# Patient Record
Sex: FEMALE | Race: OTHER | NOT HISPANIC OR LATINO
[De-identification: names, ages, dates, MRNs, and addresses within clinical notes are randomized per-mention and may not be internally consistent; named-entity substitution may affect disease eponyms.]

---

## 2022-03-04 PROBLEM — Z00.00 ENCOUNTER FOR PREVENTIVE HEALTH EXAMINATION: Status: ACTIVE | Noted: 2022-03-04

## 2022-05-11 ENCOUNTER — APPOINTMENT (OUTPATIENT)
Dept: NEPHROLOGY | Facility: CLINIC | Age: 77
End: 2022-05-11
Payer: MEDICARE

## 2022-05-11 VITALS
SYSTOLIC BLOOD PRESSURE: 131 MMHG | DIASTOLIC BLOOD PRESSURE: 79 MMHG | TEMPERATURE: 97.9 F | HEIGHT: 64 IN | WEIGHT: 163.14 LBS | HEART RATE: 80 BPM | OXYGEN SATURATION: 97 % | BODY MASS INDEX: 27.85 KG/M2

## 2022-05-11 VITALS — SYSTOLIC BLOOD PRESSURE: 120 MMHG | DIASTOLIC BLOOD PRESSURE: 80 MMHG

## 2022-05-11 VITALS — SYSTOLIC BLOOD PRESSURE: 110 MMHG | DIASTOLIC BLOOD PRESSURE: 70 MMHG

## 2022-05-11 DIAGNOSIS — D63.1 CHRONIC KIDNEY DISEASE, UNSPECIFIED: ICD-10-CM

## 2022-05-11 DIAGNOSIS — Z78.9 OTHER SPECIFIED HEALTH STATUS: ICD-10-CM

## 2022-05-11 DIAGNOSIS — N18.9 CHRONIC KIDNEY DISEASE, UNSPECIFIED: ICD-10-CM

## 2022-05-11 DIAGNOSIS — Z86.018 PERSONAL HISTORY OF OTHER BENIGN NEOPLASM: ICD-10-CM

## 2022-05-11 DIAGNOSIS — Z86.010 PERSONAL HISTORY OF COLONIC POLYPS: ICD-10-CM

## 2022-05-11 DIAGNOSIS — Z86.39 PERSONAL HISTORY OF OTHER ENDOCRINE, NUTRITIONAL AND METABOLIC DISEASE: ICD-10-CM

## 2022-05-11 PROCEDURE — 99204 OFFICE O/P NEW MOD 45 MIN: CPT

## 2022-05-11 RX ORDER — ATORVASTATIN CALCIUM 10 MG/1
10 TABLET, FILM COATED ORAL
Qty: 90 | Refills: 3 | Status: ACTIVE | COMMUNITY
Start: 2022-05-11

## 2022-05-11 RX ORDER — MULTIVIT-MIN/FOLIC/VIT K/LYCOP 400-300MCG
1000 TABLET ORAL DAILY
Refills: 0 | Status: DISCONTINUED | COMMUNITY
Start: 2022-05-11 | End: 2022-05-11

## 2022-05-12 LAB
25(OH)D3 SERPL-MCNC: 37.5 NG/ML
ALBUMIN SERPL ELPH-MCNC: 4.6 G/DL
ANION GAP SERPL CALC-SCNC: 13 MMOL/L
APPEARANCE: CLEAR
BACTERIA: NEGATIVE
BILIRUBIN URINE: NEGATIVE
BLOOD URINE: NEGATIVE
BUN SERPL-MCNC: 22 MG/DL
CALCIUM SERPL-MCNC: 9.6 MG/DL
CALCIUM SERPL-MCNC: 9.6 MG/DL
CHLORIDE SERPL-SCNC: 103 MMOL/L
CO2 SERPL-SCNC: 24 MMOL/L
COLOR: NORMAL
CREAT SERPL-MCNC: 1.53 MG/DL
CREAT SPEC-SCNC: 128 MG/DL
CREAT/PROT UR: 0.1 RATIO
DEPRECATED KAPPA LC FREE/LAMBDA SER: 1.29 RATIO
EGFR: 35 ML/MIN/1.73M2
ESTIMATED AVERAGE GLUCOSE: 143 MG/DL
GLUCOSE QUALITATIVE U: NEGATIVE
GLUCOSE SERPL-MCNC: 96 MG/DL
HBA1C MFR BLD HPLC: 6.6 %
HCT VFR BLD CALC: 38.4 %
HGB BLD-MCNC: 11.8 G/DL
HYALINE CASTS: 0 /LPF
KAPPA LC CSF-MCNC: 2.43 MG/DL
KAPPA LC SERPL-MCNC: 3.13 MG/DL
KETONES URINE: NEGATIVE
LEUKOCYTE ESTERASE URINE: NEGATIVE
MICROSCOPIC-UA: NORMAL
NITRITE URINE: NEGATIVE
PARATHYROID HORMONE INTACT: 41 PG/ML
PH URINE: 5.5
PHOSPHATE SERPL-MCNC: 4.3 MG/DL
POTASSIUM SERPL-SCNC: 4.4 MMOL/L
PROT UR-MCNC: 6 MG/DL
PROTEIN URINE: NEGATIVE
RED BLOOD CELLS URINE: 1 /HPF
SODIUM SERPL-SCNC: 141 MMOL/L
SPECIFIC GRAVITY URINE: 1.02
SQUAMOUS EPITHELIAL CELLS: 0 /HPF
UROBILINOGEN URINE: NORMAL
WHITE BLOOD CELLS URINE: 1 /HPF

## 2022-05-12 NOTE — REVIEW OF SYSTEMS
[Feeling Tired] : feeling tired [As Noted in HPI] : as noted in HPI [Negative] : Heme/Lymph [Recent Weight Loss (___ Lbs)] : no recent weight loss [Lower Ext Edema] : no lower extremity edema [Wheezing] : no wheezing [Cough] : no cough [SOB on Exertion] : no shortness of breath during exertion [PND] : no PND [FreeTextEntry2] : recent COVID s/o mAB infusion

## 2022-05-12 NOTE — PHYSICAL EXAM
[General Appearance - Alert] : alert [General Appearance - In No Acute Distress] : in no acute distress [Sclera] : the sclera and conjunctiva were normal [Jugular Venous Distention Increased] : there was no jugular-venous distention [Auscultation Breath Sounds / Voice Sounds] : lungs were clear to auscultation bilaterally [Heart Rate And Rhythm] : heart rate was normal and rhythm regular [Heart Sounds] : normal S1 and S2 [Edema] : there was no peripheral edema [Abdomen Soft] : soft [] : no rash [No Focal Deficits] : no focal deficits [Oriented To Time, Place, And Person] : oriented to person, place, and time

## 2022-05-12 NOTE — HISTORY OF PRESENT ILLNESS
[FreeTextEntry1] : 76 year old female with Hx of DM2, HTN for 15 years. her DM is controlled expect for questionable retinopathy where laser therapy was done and since then her eye exams have unremarkable. her BP is treated with Irbesartan the dose of which has been reduced to 150mg from 300mg. her BP at home is well controlled but reports few episodes of low BP. her Cr has been high since 2017 and was seen at [facility] for that. her cr levels ranges from 1.2-1.6 but trend is up and down. most recently she was in the hospital for COVID and her cr was 1.6. on review of her reports brought with her show US of the kidneys with normal size, mild echogenecity and few undetermined lesion( too small). she denies NSAIDS intake. she does have few hundred mg of  proteinuria. no edema . she does have anemia and was told she has uterine fibroid. colonoscopy was done. polys removed. no cancer was found

## 2022-05-12 NOTE — ASSESSMENT
BOTOX DISCHARGE INSTRUCTIONS:    Eyelid drooping or facial muscle weakness is one of the possible side effect of Botox injections.     If this happens, it can take 2 weeks or longer to resolve.     Extreme caution is used to place the medication in areas that will not cause this issue, but it is still a possibility.     Do not rub the eye or forehead areas for 24 hours after injections. If you do, some of the Botox can get displaced into different muscles, such as the ones that life the eyelid and/or brow. This can cause eyelid drooping, muscle weakness, or some other side effect you may not wish to experience.     Try to remain sitting for 4 hours after Botox injections.     Do not lie down for 8-12 hours after injections.     Avoid massages, hot showers, and saunas for 24 hours.     Avoid running or intense exercise for 24 hours.           For Your Information   In order to manage your health all in once place (contact your provider, request refills, receive results quicker, attend virtual video visits, etc) sign up for for a [employer] account. You can go online to [employer] and click Sign Up Now.     You can also create an account on your mobile device by downloading the [employer] alina from the Alina Store or Google Play.      · If your provider ordered TESTING today, you will typically be notified of your test results within 3-5 business days unless specified otherwise. If you have not received your results within 5 business days please call your provider's office.     PLEASE NOTE: Some lab testing has been delayed due to the current public health emergency. Thank you for your patience as we work to provide you with results as soon as we possibly can!    · If your provider ordered IMAGING or other specialized tests to be performed, you will typically be contacted to schedule this testing within 2 business days.     PLEASE NOTE: Some scheduling has been delayed due to the current public health  [FreeTextEntry1] : CKD stage 3 with minimal proteinuria and anemia \par HTN well controlled.. episodes of hypotension ( nonorthostatic today) \par images;  reviewed reports \par A/CKD with a likely hemodynamic related variation in her kidney function due to Hypotension and ARB decreasing the ability of the kidneys to compensate and ? some degree of autonomic dysfunction fro DM. \par -will decrease Irbesartan to 75mg to be taken in am. \par -DC vitamin C as it can cause increase in  Ca oxalate production and  precipitation  \par -asked her to check BP daily at different times and report to me in 2 weeks \par -check repeat cr in 1 month to see if improved Rx given \par anemia;\par -check iron studies  if low will consider po versus IV depending on severity. \par -no need for EPO as hgb>10. \par -r/o MM.. SPEP/IPEP/free immunoglobulin sent as well\par -send protein/cr ratio \par RTC in 3 months \par  emergency. If you have not received a call to schedule within that timeframe, please contact Central Scheduling at [phone]. Again, thank you for your patience!    · If you are in need of a medication refill please use one of the following options:  1. Call your pharmacy if there are refills remaining.  2. [email or web address]- [email or web address]  3. Call your providers office    PLEASE BE AWARE OF WHAT MEDICATIONS YOU ARE CURRENTLY TAKING, INCLUDING OVER-THE-COUNTER MEDICATIONS AND SUPPLEMENTS. REVIEW THE LIST INCLUDED WITH THIS PACKET AND BE SURE TO NOTIFY THE PROVIDER OF ANY CHANGES THAT NEED TO BE MADE.     · You may be receiving a survey via mail or e-mail following your visit.  Please take the time to complete this, as your feedback is very important to us!  We strive to make your experience exceptional and your comments help us with that goal.  We look forward to hearing from you!    · For future visits, please arrive 15 minutes earlier than your scheduled appointment time to allow the nurse enough time to update your chart prior to seeing the provider. This ensures you receive the full amount of visit time with your provider. Thank you!

## 2022-05-16 LAB
ALBUMIN MFR SERPL ELPH: 60.9 %
ALBUMIN SERPL-MCNC: 4.2 G/DL
ALBUMIN/GLOB SERPL: 1.6 RATIO
ALPHA1 GLOB MFR SERPL ELPH: 4.2 %
ALPHA1 GLOB SERPL ELPH-MCNC: 0.3 G/DL
ALPHA2 GLOB MFR SERPL ELPH: 12.6 %
ALPHA2 GLOB SERPL ELPH-MCNC: 0.9 G/DL
B-GLOBULIN MFR SERPL ELPH: 10.3 %
B-GLOBULIN SERPL ELPH-MCNC: 0.7 G/DL
DEPRECATED KAPPA LC FREE/LAMBDA SER: 1.29 RATIO
FERRITIN SERPL-MCNC: 55 NG/ML
GAMMA GLOB FLD ELPH-MCNC: 0.8 G/DL
GAMMA GLOB MFR SERPL ELPH: 12 %
IGA SER QL IEP: 129 MG/DL
IGG SER QL IEP: 827 MG/DL
IGM SER QL IEP: 67 MG/DL
INTERPRETATION SERPL IEP-IMP: NORMAL
IRON SATN MFR SERPL: 19 %
IRON SERPL-MCNC: 57 UG/DL
KAPPA LC CSF-MCNC: 2.43 MG/DL
KAPPA LC SERPL-MCNC: 3.13 MG/DL
M PROTEIN SPEC IFE-MCNC: NORMAL
PROT SERPL-MCNC: 6.9 G/DL
PROT SERPL-MCNC: 6.9 G/DL
TIBC SERPL-MCNC: 304 UG/DL
UIBC SERPL-MCNC: 247 UG/DL

## 2022-07-05 ENCOUNTER — RX RENEWAL (OUTPATIENT)
Age: 77
End: 2022-07-05

## 2022-08-01 ENCOUNTER — RX RENEWAL (OUTPATIENT)
Age: 77
End: 2022-08-01

## 2022-08-10 ENCOUNTER — APPOINTMENT (OUTPATIENT)
Dept: NEPHROLOGY | Facility: CLINIC | Age: 77
End: 2022-08-10

## 2022-08-10 ENCOUNTER — RX RENEWAL (OUTPATIENT)
Age: 77
End: 2022-08-10

## 2022-08-10 VITALS
DIASTOLIC BLOOD PRESSURE: 84 MMHG | WEIGHT: 163.14 LBS | OXYGEN SATURATION: 97 % | HEART RATE: 67 BPM | HEIGHT: 64 IN | SYSTOLIC BLOOD PRESSURE: 153 MMHG | BODY MASS INDEX: 27.85 KG/M2

## 2022-08-10 VITALS — SYSTOLIC BLOOD PRESSURE: 140 MMHG | DIASTOLIC BLOOD PRESSURE: 70 MMHG

## 2022-08-10 PROCEDURE — 99214 OFFICE O/P EST MOD 30 MIN: CPT | Mod: GC

## 2022-08-10 RX ORDER — DOXYCYCLINE HYCLATE 100 MG/1
100 CAPSULE ORAL
Qty: 20 | Refills: 0 | Status: COMPLETED | COMMUNITY
Start: 2022-06-30

## 2022-08-10 RX ORDER — AMOXICILLIN 875 MG/1
875 TABLET, FILM COATED ORAL
Qty: 14 | Refills: 0 | Status: COMPLETED | COMMUNITY
Start: 2022-07-25

## 2022-08-10 RX ORDER — PREDNISONE 10 MG/1
10 TABLET ORAL
Qty: 6 | Refills: 0 | Status: COMPLETED | COMMUNITY
Start: 2022-07-25

## 2022-08-10 RX ORDER — BLOOD SUGAR DIAGNOSTIC
STRIP MISCELLANEOUS
Qty: 100 | Refills: 0 | Status: COMPLETED | COMMUNITY
Start: 2022-05-23

## 2022-08-10 RX ORDER — CHLORHEXIDINE GLUCONATE, 0.12% ORAL RINSE 1.2 MG/ML
0.12 SOLUTION DENTAL
Qty: 473 | Refills: 0 | Status: COMPLETED | COMMUNITY
Start: 2022-07-25

## 2022-08-10 RX ORDER — TRIAMCINOLONE ACETONIDE 1 MG/G
0.1 CREAM TOPICAL
Qty: 45 | Refills: 0 | Status: COMPLETED | COMMUNITY
Start: 2022-07-26

## 2022-08-10 RX ORDER — LANCETS 30 GAUGE
EACH MISCELLANEOUS
Qty: 100 | Refills: 0 | Status: COMPLETED | COMMUNITY
Start: 2022-06-20

## 2022-08-10 RX ORDER — FUROSEMIDE 20 MG/1
20 TABLET ORAL
Qty: 90 | Refills: 3 | Status: DISCONTINUED | COMMUNITY
Start: 2022-06-30 | End: 2022-08-10

## 2022-08-10 NOTE — PHYSICAL EXAM
[General Appearance - Alert] : alert [General Appearance - In No Acute Distress] : in no acute distress [General Appearance - Well-Appearing] : healthy appearing [Sclera] : the sclera and conjunctiva were normal [Extraocular Movements] : extraocular movements were intact [Hearing Threshold Finger Rub Not [place]] : hearing was normal [Neck Appearance] : the appearance of the neck was normal [] : no respiratory distress [Respiration, Rhythm And Depth] : normal respiratory rhythm and effort [Exaggerated Use Of Accessory Muscles For Inspiration] : no accessory muscle use [Heart Rate And Rhythm] : heart rate was normal and rhythm regular [Edema] : there was no peripheral edema [Bowel Sounds] : normal bowel sounds [Abdomen Soft] : soft [Involuntary Movements] : no involuntary movements were seen [Skin Color & Pigmentation] : normal skin color and pigmentation [No Focal Deficits] : no focal deficits [Oriented To Time, Place, And Person] : oriented to person, place, and time [Memory Recent] : recent memory was not impaired

## 2022-08-11 LAB
ALBUMIN SERPL ELPH-MCNC: 4.3 G/DL
ANION GAP SERPL CALC-SCNC: 13 MMOL/L
APPEARANCE: CLEAR
BACTERIA: NEGATIVE
BILIRUBIN URINE: NEGATIVE
BLOOD URINE: NEGATIVE
BUN SERPL-MCNC: 22 MG/DL
CALCIUM SERPL-MCNC: 9.2 MG/DL
CHLORIDE SERPL-SCNC: 105 MMOL/L
CO2 SERPL-SCNC: 23 MMOL/L
COLOR: NORMAL
CREAT SERPL-MCNC: 1.68 MG/DL
CREAT SPEC-SCNC: 105 MG/DL
CREAT/PROT UR: NORMAL RATIO
EGFR: 31 ML/MIN/1.73M2
GLUCOSE QUALITATIVE U: NEGATIVE
GLUCOSE SERPL-MCNC: 107 MG/DL
HYALINE CASTS: 1 /LPF
KETONES URINE: NEGATIVE
LEUKOCYTE ESTERASE URINE: NEGATIVE
MICROSCOPIC-UA: NORMAL
NITRITE URINE: NEGATIVE
PH URINE: 6
PHOSPHATE SERPL-MCNC: 3.9 MG/DL
POTASSIUM SERPL-SCNC: 4.5 MMOL/L
PROT UR-MCNC: <4 MG/DL
PROTEIN URINE: NEGATIVE
RED BLOOD CELLS URINE: 1 /HPF
SODIUM SERPL-SCNC: 142 MMOL/L
SPECIFIC GRAVITY URINE: 1.02
SQUAMOUS EPITHELIAL CELLS: 0 /HPF
UROBILINOGEN URINE: NORMAL
WHITE BLOOD CELLS URINE: 1 /HPF

## 2022-08-11 NOTE — REVIEW OF SYSTEMS
[Fever] : no fever [Chills] : no chills [Eye Pain] : no eye pain [Loss Of Hearing] : no hearing loss [Chest Pain] : no chest pain [Palpitations] : no palpitations [Lower Ext Edema] : no lower extremity edema [Shortness Of Breath] : no shortness of breath [Wheezing] : no wheezing [Cough] : no cough [Abdominal Pain] : no abdominal pain [Dysuria] : no dysuria [Incontinence] : no incontinence [Limb Swelling] : no limb swelling [Dizziness] : no dizziness [Fainting] : no fainting [FreeTextEntry2] : Headache+

## 2022-08-11 NOTE — HISTORY OF PRESENT ILLNESS
[FreeTextEntry1] : 76 year old female with Hx of DM2, HTN for 15 years. her DM is controlled expect for questionable retinopathy where laser therapy was done and since then her eye exams have unremarkable. her BP is treated with Irbesartan the dose of which has been reduced to 150mg from 300mg. her BP at home is well controlled but reports few episodes of low BP. her Cr has been high since 2017 and was seen at [facility] for that. her cr levels ranges from 1.2-1.6 but trend is up and down. most recently she was in the hospital for COVID and her cr was 1.6. on review of her reports brought with her show US of the kidneys with normal size, mild echogenecity and few undetermined lesion( too small). she denies NSAIDS intake. she does have few hundred mg of  proteinuria. no edema . she does have anemia and was told she has uterine fibroid. colonoscopy was done. polys removed. no cancer was found \par \par 8/10/22\par Pt. presents today for follow up for HTN and CKD. Pt. states she eat out most days of the week. For the last 2 days her blood pressure has been elevated. Wakes up with headaches the last 2 days. Plans to travel to [place] soon. Plan to start on bisphosphonate for osteoporosis in forearm. BP elevated today, states had chinese food yesterday. All questions about salt substitutes answered. Irbesartan reduced on last visit. No evidence of proteinuria on last blood work. Since last visit Pt. did take doxycycline for 20 days for LLE infection, now resolved. Did have dental procedure performed and was given prophylactic antibiotics with prednisone for history of reaction to iodinated contrast. 
No

## 2022-08-11 NOTE — ASSESSMENT
[FreeTextEntry1] : Pt. is a 76 y.o. F w/ PMX of HTN, HLD and now DM presents for follow up. \par \par CKD stage 3 with minimal proteinuria and anemia \par HTN not controlled- Start amlodipine 2.5mg daily. Discussed at length diet modification and sodium restriction. \par images;  reviewed reports \par CKD with a likely hemodynamic related variation in her kidney function due to Hypotension and ARB decreasing the ability of the kidneys to compensate and ? some degree of autonomic dysfunction from DM. Will need glucose control  A1c 6.6%. \par - C/w Irbesartan to 75mg \par - Continue to monitor BP daily at different times\par - Repeat blood work today including UA and protein Cr. ratio. \par -Iron studies border line, TSAT 19%; HgB on lower end of normal but acceptable. \par -no need for EPO as hgb>10. \par -Paraproteinemia studies wnl.  \par -No proteinuria\par - Will discuss with team dosing for bisphosphonate. According to guidelines, no dosage adjustment necessary as long as eGFR above 30 however would favor more infrequent dosing as eGFR close to 30.\par \par RTC in 3 months \par

## 2022-08-11 NOTE — END OF VISIT
[] : Fellow [FreeTextEntry3] : CKD 3B without proteinuria on ARB \par HTN not well controlled with lots of opportunity for salt reduction as pt admits to salt indiscretion. she counseled extensively. \par will add Amlodipine as well. \par osteoporosis in CKD. options are \par Prolia no dose adjustment needed. SE hypocalcemia sometimes severe\par PTH analogs like Forteo ( max for two year duration) \par or bisphosphate.. although no dose adjustment is necessary per recommendation for eGFR>30, I would favor to dose less frequently. \par will communicate with Dr. So as well \par \par

## 2022-09-07 ENCOUNTER — RX RENEWAL (OUTPATIENT)
Age: 77
End: 2022-09-07

## 2022-11-09 ENCOUNTER — APPOINTMENT (OUTPATIENT)
Dept: NEPHROLOGY | Facility: CLINIC | Age: 77
End: 2022-11-09

## 2022-11-09 VITALS
WEIGHT: 164.24 LBS | BODY MASS INDEX: 28.04 KG/M2 | HEIGHT: 64 IN | OXYGEN SATURATION: 99 % | TEMPERATURE: 97.3 F | DIASTOLIC BLOOD PRESSURE: 78 MMHG | SYSTOLIC BLOOD PRESSURE: 138 MMHG

## 2022-11-09 VITALS — DIASTOLIC BLOOD PRESSURE: 76 MMHG | SYSTOLIC BLOOD PRESSURE: 120 MMHG

## 2022-11-09 DIAGNOSIS — I10 ESSENTIAL (PRIMARY) HYPERTENSION: ICD-10-CM

## 2022-11-09 DIAGNOSIS — M10.9 GOUT, UNSPECIFIED: ICD-10-CM

## 2022-11-09 DIAGNOSIS — Z79.4 TYPE 2 DIABETES MELLITUS WITH STABLE PROLIFERATIVE DIABETIC RETINOPATHY, LEFT EYE: ICD-10-CM

## 2022-11-09 DIAGNOSIS — N28.1 CYST OF KIDNEY, ACQUIRED: ICD-10-CM

## 2022-11-09 DIAGNOSIS — E11.3552 TYPE 2 DIABETES MELLITUS WITH STABLE PROLIFERATIVE DIABETIC RETINOPATHY, LEFT EYE: ICD-10-CM

## 2022-11-09 PROCEDURE — 99214 OFFICE O/P EST MOD 30 MIN: CPT | Mod: GC

## 2022-11-09 RX ORDER — AMOXICILLIN 500 MG/1
500 CAPSULE ORAL 4 TIMES DAILY
Refills: 0 | Status: COMPLETED | COMMUNITY
Start: 2022-05-11 | End: 2022-11-09

## 2022-11-09 RX ORDER — ICOSAPENT ETHYL 1 G/1
1 CAPSULE ORAL
Qty: 360 | Refills: 0 | Status: ACTIVE | COMMUNITY
Start: 2022-08-11

## 2022-11-09 RX ORDER — OMEGA-3/DHA/EPA/FISH OIL 35-113.5MG
1000 TABLET,CHEWABLE ORAL DAILY
Refills: 0 | Status: COMPLETED | COMMUNITY
Start: 2022-05-11 | End: 2022-11-09

## 2022-11-10 LAB
25(OH)D3 SERPL-MCNC: 45.1 NG/ML
ALBUMIN SERPL ELPH-MCNC: 4.6 G/DL
ANION GAP SERPL CALC-SCNC: 13 MMOL/L
APPEARANCE: CLEAR
BACTERIA: NEGATIVE
BILIRUBIN URINE: NEGATIVE
BLOOD URINE: NEGATIVE
BUN SERPL-MCNC: 26 MG/DL
CALCIUM SERPL-MCNC: 9.6 MG/DL
CALCIUM SERPL-MCNC: 9.6 MG/DL
CHLORIDE SERPL-SCNC: 101 MMOL/L
CO2 SERPL-SCNC: 24 MMOL/L
COLOR: COLORLESS
CREAT SERPL-MCNC: 1.65 MG/DL
CREAT SPEC-SCNC: 46 MG/DL
CREAT/PROT UR: 0.1 RATIO
EGFR: 32 ML/MIN/1.73M2
FERRITIN SERPL-MCNC: 59 NG/ML
GLUCOSE QUALITATIVE U: NEGATIVE
GLUCOSE SERPL-MCNC: 103 MG/DL
HCT VFR BLD CALC: 39.8 %
HGB BLD-MCNC: 12.3 G/DL
HYALINE CASTS: 0 /LPF
IRON SATN MFR SERPL: 19 %
IRON SERPL-MCNC: 63 UG/DL
KETONES URINE: NEGATIVE
LEUKOCYTE ESTERASE URINE: NEGATIVE
MICROSCOPIC-UA: NORMAL
NITRITE URINE: NEGATIVE
PARATHYROID HORMONE INTACT: 48 PG/ML
PH URINE: 6
PHOSPHATE SERPL-MCNC: 4 MG/DL
POTASSIUM SERPL-SCNC: 4.8 MMOL/L
PROT UR-MCNC: 4 MG/DL
PROTEIN URINE: NEGATIVE
RED BLOOD CELLS URINE: 0 /HPF
SODIUM SERPL-SCNC: 138 MMOL/L
SPECIFIC GRAVITY URINE: 1.01
SQUAMOUS EPITHELIAL CELLS: 0 /HPF
TIBC SERPL-MCNC: 336 UG/DL
UIBC SERPL-MCNC: 273 UG/DL
UROBILINOGEN URINE: NORMAL
WHITE BLOOD CELLS URINE: 1 /HPF

## 2022-11-10 NOTE — REVIEW OF SYSTEMS
[Lower Ext Edema] : lower extremity edema [Limb Swelling] : limb swelling [Fever] : no fever [Chills] : no chills [Eye Pain] : no eye pain [Loss Of Hearing] : no hearing loss [Chest Pain] : no chest pain [Palpitations] : no palpitations [Shortness Of Breath] : no shortness of breath [Wheezing] : no wheezing [Cough] : no cough [SOB on Exertion] : no shortness of breath during exertion [Orthopnea] : no orthopnea [Abdominal Pain] : no abdominal pain [Diarrhea] : no diarrhea [Dysuria] : no dysuria [Incontinence] : no incontinence [Dizziness] : no dizziness [Fainting] : no fainting [FreeTextEntry2] : Headache+

## 2022-11-10 NOTE — PHYSICAL EXAM
[General Appearance - Alert] : alert [General Appearance - In No Acute Distress] : in no acute distress [General Appearance - Well Nourished] : well nourished [General Appearance - Well Developed] : well developed [Sclera] : the sclera and conjunctiva were normal [Hearing Threshold Finger Rub Not [place]] : hearing was normal [Neck Appearance] : the appearance of the neck was normal [] : no respiratory distress [Respiration, Rhythm And Depth] : normal respiratory rhythm and effort [Exaggerated Use Of Accessory Muscles For Inspiration] : no accessory muscle use [Auscultation Breath Sounds / Voice Sounds] : lungs were clear to auscultation bilaterally [Heart Rate And Rhythm] : heart rate was normal and rhythm regular [Heart Sounds] : normal S1 and S2 [Murmurs] : no murmurs [Bowel Sounds] : normal bowel sounds [No CVA Tenderness] : no ~M costovertebral angle tenderness [Involuntary Movements] : no involuntary movements were seen [Oriented To Time, Place, And Person] : oriented to person, place, and time [Impaired Insight] : insight and judgment were intact [Affect] : the affect was normal [Memory Recent] : recent memory was not impaired [FreeTextEntry1] : LE edema

## 2022-11-10 NOTE — END OF VISIT
[] : Fellow [FreeTextEntry3] : stage 3b CKD. no proteinuria \par HTN better. needs to avoid salt \par complex cyst from renal US. will set up for MRI with contrast \par Bisphosphonate for osteoporosis ok but reduced dose

## 2022-11-10 NOTE — HISTORY OF PRESENT ILLNESS
[FreeTextEntry1] : Pt. presents today for follow up for HTN and CKD. Pt. had blood work done on 10/25 after seeing PCP and SCr. increased to 1.9. She had COVID booster on 11/3 and had chills afterwards. Denies any fever, cough, dyspnea, or chest pain. Endorses some lower extremity edema that is better in the morning and worse at the end of the day. Denies taking blood pressure at home. UA on recent blood work was negative for blood or protein. Pt. started on Risedronate 35mg every other week.

## 2022-11-10 NOTE — ASSESSMENT
[FreeTextEntry1] : Pt. is a 76 y.o. F w/ PMX of HTN, HLD and now DM presents for follow up. \par \par CKD stage 3 with minimal proteinuria and anemia \par HTN controlled- c/w amlodipine 2.5mg daily. Discussed at length diet modification and sodium restriction. \par images; reviewed reports. Renal US performed last in 2021 without hydro or stones; both kidneys 9 cm. Repeat SCr. on 11/09 1.6\par  Will need glucose control  last A1c 6.6%. \par - C/w Irbesartan to 75mg \par - Repeat urine studies without blood or protein\par -Iron studies border line, TSAT 19%; HgB on lower end of normal but acceptable. \par -no need for EPO as hgb>10. \par -Paraproteinemia studies wnl.  \par -No proteinuria\par - On Risedronate 35mg Every other week\par \par All latest lab results discussed with patient.\par \par RTC in 3 months \par

## 2022-11-16 PROBLEM — M10.9 ARTICULAR GOUT: Status: ACTIVE | Noted: 2022-11-16

## 2023-01-31 ENCOUNTER — NON-APPOINTMENT (OUTPATIENT)
Age: 78
End: 2023-01-31

## 2023-02-08 ENCOUNTER — RX RENEWAL (OUTPATIENT)
Age: 78
End: 2023-02-08

## 2023-05-10 ENCOUNTER — APPOINTMENT (OUTPATIENT)
Dept: NEPHROLOGY | Facility: CLINIC | Age: 78
End: 2023-05-10
Payer: MEDICARE

## 2023-05-10 VITALS
WEIGHT: 157.63 LBS | DIASTOLIC BLOOD PRESSURE: 72 MMHG | OXYGEN SATURATION: 99 % | SYSTOLIC BLOOD PRESSURE: 140 MMHG | HEART RATE: 78 BPM | TEMPERATURE: 97.3 F | BODY MASS INDEX: 26.91 KG/M2 | HEIGHT: 64 IN

## 2023-05-10 VITALS — SYSTOLIC BLOOD PRESSURE: 130 MMHG | DIASTOLIC BLOOD PRESSURE: 75 MMHG

## 2023-05-10 DIAGNOSIS — D50.8 OTHER IRON DEFICIENCY ANEMIAS: ICD-10-CM

## 2023-05-10 DIAGNOSIS — M80.00XG AGE-RELATED OSTEOPOROSIS WITH CURRENT PATHOLOGICAL FRACTURE, UNSPECIFIED SITE, SUBSEQUENT ENCOUNTER FOR FRACTURE WITH DELAYED HEALING: ICD-10-CM

## 2023-05-10 PROCEDURE — 99214 OFFICE O/P EST MOD 30 MIN: CPT

## 2023-05-10 RX ORDER — FERROUS SULFATE 142(45)MG
45 TABLET, EXTENDED RELEASE ORAL
Qty: 90 | Refills: 3 | Status: ACTIVE | COMMUNITY
Start: 2023-05-10 | End: 1900-01-01

## 2023-05-10 NOTE — ASSESSMENT
[FreeTextEntry1] : \par Pt. is a 77 y.o. F w/ PMX of HTN, HLD,  DM, CKD presents for follow up. \par \par CKD stage 3b with minimal proteinuria and anemia \par -iron def anemia no need for EILEEN hgb>10. however advised to start slow fe and see GI for colonoscopy consult.\par \par HTN controlled- c/w amlodipine 2.5mg daily. Discussed at length diet modification and sodium restriction. \par - C/w Irbesartan to 75mg \par \par images; reviewed .1.  stones\par - 24 hour rx given\par high water intake at least 2.5 Liters/ or  until nocturia\par low animal protein intake\par low salt intake \par 24 hour stone assessment test\par avoid supplement calcium.  no need to restrict normal calcium diet in natural food( egg, dairy)\par 2. complex cyst not cancerous based on MRI.\par 3. pancreatic cyst. copy given and advised to see Dr. Campbell from GI to discuss further HUMPHREYS \par \par  Will need glucose control last A1c 6.6%. \par \par -No proteinuria\par \par - osteoporosis\par recent fracture s/p fall\par On Risedronate 35mg Every other week\par -continue vitamin D \par -no supplemental ca\par \par \par RTC in 3 months \par . \par

## 2023-05-10 NOTE — PHYSICAL EXAM
[General Appearance - Alert] : alert [General Appearance - In No Acute Distress] : in no acute distress [General Appearance - Well Nourished] : well nourished [General Appearance - Well Developed] : well developed [Hearing Threshold Finger Rub Not [place]] : hearing was normal [Sclera] : the sclera and conjunctiva were normal [Neck Appearance] : the appearance of the neck was normal [Jugular Venous Distention Increased] : there was no jugular-venous distention [] : no respiratory distress [Respiration, Rhythm And Depth] : normal respiratory rhythm and effort [Exaggerated Use Of Accessory Muscles For Inspiration] : no accessory muscle use [Heart Rate And Rhythm] : heart rate was normal and rhythm regular [Auscultation Breath Sounds / Voice Sounds] : lungs were clear to auscultation bilaterally [Heart Sounds] : normal S1 and S2 [Murmurs] : no murmurs [FreeTextEntry1] : trace LE edema  [Bowel Sounds] : normal bowel sounds [No CVA Tenderness] : no ~M costovertebral angle tenderness [Involuntary Movements] : no involuntary movements were seen [Oriented To Time, Place, And Person] : oriented to person, place, and time [Impaired Insight] : insight and judgment were intact [Affect] : the affect was normal [Memory Recent] : recent memory was not impaired

## 2023-05-10 NOTE — REVIEW OF SYSTEMS
[Fever] : no fever [Chills] : no chills [Eye Pain] : no eye pain [Loss Of Hearing] : no hearing loss [Chest Pain] : no chest pain [Palpitations] : no palpitations [Lower Ext Edema] : lower extremity edema [Shortness Of Breath] : no shortness of breath [Wheezing] : no wheezing [Cough] : no cough [SOB on Exertion] : no shortness of breath during exertion [Orthopnea] : no orthopnea [Abdominal Pain] : no abdominal pain [Diarrhea] : no diarrhea [As Noted in HPI] : as noted in HPI [Dysuria] : no dysuria [Incontinence] : no incontinence [Dizziness] : no dizziness [Fainting] : no fainting

## 2023-05-22 ENCOUNTER — RX RENEWAL (OUTPATIENT)
Age: 78
End: 2023-05-22

## 2023-05-22 RX ORDER — RISEDRONATE SODIUM 35 MG/1
35 TABLET, FILM COATED ORAL
Qty: 12 | Refills: 0 | Status: ACTIVE | COMMUNITY
Start: 2022-10-25 | End: 1900-01-01

## 2023-06-02 ENCOUNTER — NON-APPOINTMENT (OUTPATIENT)
Age: 78
End: 2023-06-02

## 2023-06-12 NOTE — HISTORY OF PRESENT ILLNESS
[FreeTextEntry1] : we discussed lot of issues\par 1. recent US of the kidney showing kidney stones; was advised to not take ca supplements. high water and low salt intake needs.  \par 2. MRI showing complex cyst but not cancerous however a pancreas cyst that needs monitoring. and was advised to seek her GI opinion about it ( copy given)\par 3. review of labs showing low ferritin 24. recent colonoscopy 3 years ago s/p polypectomy\par 4. her labs were reviewed Cr 1.65\par 5. HTN still and needs to decrease salt intake \par 6- also Ca supplement for osteoporosis  106

## 2023-09-06 ENCOUNTER — APPOINTMENT (OUTPATIENT)
Dept: NEPHROLOGY | Facility: CLINIC | Age: 78
End: 2023-09-06
Payer: MEDICARE

## 2023-09-06 VITALS
TEMPERATURE: 97.8 F | OXYGEN SATURATION: 98 % | HEIGHT: 64 IN | DIASTOLIC BLOOD PRESSURE: 73 MMHG | HEART RATE: 76 BPM | WEIGHT: 153.22 LBS | SYSTOLIC BLOOD PRESSURE: 130 MMHG | BODY MASS INDEX: 26.16 KG/M2

## 2023-09-06 DIAGNOSIS — N18.32 CHRONIC KIDNEY DISEASE, STAGE 3B: ICD-10-CM

## 2023-09-06 DIAGNOSIS — E11.9 TYPE 2 DIABETES MELLITUS W/OUT COMPLICATIONS: ICD-10-CM

## 2023-09-06 LAB
ALBUMIN SERPL ELPH-MCNC: 4.5 G/DL
ANION GAP SERPL CALC-SCNC: 17 MMOL/L
BUN SERPL-MCNC: 27 MG/DL
CALCIUM SERPL-MCNC: 9.8 MG/DL
CHLORIDE SERPL-SCNC: 107 MMOL/L
CO2 SERPL-SCNC: 19 MMOL/L
CREAT SERPL-MCNC: 1.47 MG/DL
CREAT SPEC-SCNC: 135 MG/DL
CREAT/PROT UR: 0.1 RATIO
EGFR: 37 ML/MIN/1.73M2
ESTIMATED AVERAGE GLUCOSE: 131 MG/DL
GLUCOSE SERPL-MCNC: 114 MG/DL
HBA1C MFR BLD HPLC: 6.2 %
PHOSPHATE SERPL-MCNC: 3.9 MG/DL
POTASSIUM SERPL-SCNC: 4.5 MMOL/L
PROT UR-MCNC: 8 MG/DL
SODIUM SERPL-SCNC: 144 MMOL/L

## 2023-09-06 PROCEDURE — 99214 OFFICE O/P EST MOD 30 MIN: CPT

## 2023-09-06 RX ORDER — PREDNISONE 20 MG/1
20 TABLET ORAL DAILY
Qty: 20 | Refills: 0 | Status: DISCONTINUED | COMMUNITY
Start: 2022-11-16 | End: 2023-09-06

## 2023-09-06 NOTE — PHYSICAL EXAM
[General Appearance - Alert] : alert [General Appearance - In No Acute Distress] : in no acute distress [General Appearance - Well Nourished] : well nourished [General Appearance - Well Developed] : well developed [Sclera] : the sclera and conjunctiva were normal [Hearing Threshold Finger Rub Not [place]] : hearing was normal [Neck Appearance] : the appearance of the neck was normal [Jugular Venous Distention Increased] : there was no jugular-venous distention [] : no respiratory distress [Respiration, Rhythm And Depth] : normal respiratory rhythm and effort [Exaggerated Use Of Accessory Muscles For Inspiration] : no accessory muscle use [Auscultation Breath Sounds / Voice Sounds] : lungs were clear to auscultation bilaterally [Heart Rate And Rhythm] : heart rate was normal and rhythm regular [Heart Sounds] : normal S1 and S2 [Murmurs] : no murmurs [Edema] : there was no peripheral edema [Bowel Sounds] : normal bowel sounds [No CVA Tenderness] : no ~M costovertebral angle tenderness [Involuntary Movements] : no involuntary movements were seen [Oriented To Time, Place, And Person] : oriented to person, place, and time [Impaired Insight] : insight and judgment were intact [Affect] : the affect was normal [Memory Recent] : recent memory was not impaired

## 2023-09-07 ENCOUNTER — NON-APPOINTMENT (OUTPATIENT)
Age: 78
End: 2023-09-07

## 2023-09-08 RX ORDER — METFORMIN HYDROCHLORIDE 500 MG/1
500 TABLET, COATED ORAL DAILY
Refills: 0 | Status: ACTIVE | COMMUNITY
Start: 2022-05-11

## 2023-09-08 NOTE — REVIEW OF SYSTEMS
[As Noted in HPI] : as noted in HPI [Negative] : Respiratory [Feeling Poorly] : not feeling poorly [Feeling Tired] : not feeling tired [Chest Pain] : no chest pain [Lower Ext Edema] : no lower extremity edema [Shortness Of Breath] : no shortness of breath [SOB on Exertion] : no shortness of breath during exertion

## 2023-09-08 NOTE — ASSESSMENT
[FreeTextEntry1] : Pt. is a 77 y.o. F w/ PMX of HTN, HLD, DM, CKD presents for follow up.    CKD stage 3b with minimal proteinuria, DEZ anemia, Renal cyst/s, Kidney stones  CKD- repeat labs from today cr 1.47. ok to continue Metformin. I called Dr. So to discuss  -no objection to add farxiga.   -iron def anemia no need for EILEEN hgb>10. however, advised to start slow fe and see GI for colonoscopy consult. she is following with GI    HTN controlled- c/w amlodipine 2.5mg daily. -continue low salt diet   - C/w Irbesartan to 75mg  - 24 hour rx given. did it two weeks ago. result are not yet back   high water intake at least 2.5 Liters/ or until nocturia  low animal protein intake  low salt intake    avoid supplement calcium. no need to restrict normal calcium diet in natural food( egg, dairy)  2. complex cyst not cancerous based on MRI.  3. pancreatic cyst. copy given and advised to see Dr. Campbell from GI to discuss further HUMPHREYS. he is planning repeat MRI or EUS in 6 months.   l last A1c 6.6%. check repeat today. 6.2     -No proteinuria from today's labs.   - osteoporosis  recent fracture s/p fall  On Risedronate 35mg Every other week  -continue vitamin D  -no supplemental ca      RTC in 3 months  .

## 2023-09-08 NOTE — HISTORY OF PRESENT ILLNESS
[FreeTextEntry1] : seen Gi and is planning to do repeat MRI or EUS in 6 months for the pancreatic cyst but he was not concerned.  taking Pepcid for GERD now  did labs and 24 hrs. fir kidney stones. results are not back yet. Cr reviewed from Dr. So 1.2.  no edema  she wants to do Farxiga instead of metformin

## 2023-09-13 RX ORDER — DAPAGLIFLOZIN 5 MG/1
5 TABLET, FILM COATED ORAL
Qty: 30 | Refills: 11 | Status: DISCONTINUED | COMMUNITY
Start: 2023-09-08 | End: 2023-09-13

## 2024-02-28 ENCOUNTER — APPOINTMENT (OUTPATIENT)
Dept: NEPHROLOGY | Facility: CLINIC | Age: 79
End: 2024-02-28
Payer: MEDICARE

## 2024-02-28 VITALS
HEIGHT: 64 IN | HEART RATE: 69 BPM | TEMPERATURE: 98.1 F | OXYGEN SATURATION: 98 % | BODY MASS INDEX: 26.98 KG/M2 | SYSTOLIC BLOOD PRESSURE: 126 MMHG | DIASTOLIC BLOOD PRESSURE: 81 MMHG | WEIGHT: 158 LBS

## 2024-02-28 PROCEDURE — 99214 OFFICE O/P EST MOD 30 MIN: CPT

## 2024-02-28 PROCEDURE — G2211 COMPLEX E/M VISIT ADD ON: CPT

## 2024-02-28 NOTE — PHYSICAL EXAM
[General Appearance - Alert] : alert [General Appearance - In No Acute Distress] : in no acute distress [General Appearance - Well Nourished] : well nourished [Sclera] : the sclera and conjunctiva were normal [General Appearance - Well Developed] : well developed [Hearing Threshold Finger Rub Not [place]] : hearing was normal [Neck Appearance] : the appearance of the neck was normal [Jugular Venous Distention Increased] : there was no jugular-venous distention [] : no respiratory distress [Respiration, Rhythm And Depth] : normal respiratory rhythm and effort [Exaggerated Use Of Accessory Muscles For Inspiration] : no accessory muscle use [Auscultation Breath Sounds / Voice Sounds] : lungs were clear to auscultation bilaterally [Heart Rate And Rhythm] : heart rate was normal and rhythm regular [Heart Sounds] : normal S1 and S2 [Edema] : there was no peripheral edema [Murmurs] : no murmurs [No CVA Tenderness] : no ~M costovertebral angle tenderness [Bowel Sounds] : normal bowel sounds [Involuntary Movements] : no involuntary movements were seen [Impaired Insight] : insight and judgment were intact [Oriented To Time, Place, And Person] : oriented to person, place, and time [Affect] : the affect was normal [Memory Recent] : recent memory was not impaired

## 2024-02-28 NOTE — ASSESSMENT
[FreeTextEntry1] : Pt. is a 78 y.o. 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     CKD stage 3b with minimal proteinuria, DEZ anemia, Renal cyst/s, Kidney stones CKD-  labs from 2.5.2024 cr 1.5. ok to continue Metformin. eGFR 36 -no objection to add farxiga. but pt not ready to start. ( no proteinuria so low risk)   -iron def anemia no need for EILEEN hgb>10 (11.3) . however, advised to continue slow fe and see GI for colonoscopy consult. she is following with GI   HTN controlled- c/w amlodipine 2.5mg daily. -continue low salt diet  - C/w Irbesartan to 75mg  - 24 hour for kidney stone reviewed with pt. less animal protein. add lemon to water for citrate,.    high water intake at least 2.5 Liters/ or until nocturia  low animal protein intake  low salt intake    avoid supplement calcium. no need to restrict normal calcium diet in natural food (egg, dairy)  2. complex cyst not cancerous based on MRI.  3. pancreatic cyst. copy given and advised to see Dr. Campbell from GI to discuss further HUMPHREYS. he is following closely.  MRI soon. .  l last A1c  6.2    -No proteinuria from today's labs.  - osteoporosis  recent fracture s/p fall  On Risedronate 35mg Every other week  -continue vitamin D  -no supplemental ca      RTC in 3 months

## 2024-02-28 NOTE — REVIEW OF SYSTEMS
[Feeling Poorly] : not feeling poorly [Feeling Tired] : not feeling tired [Chest Pain] : no chest pain [Lower Ext Edema] : no lower extremity edema [SOB on Exertion] : no shortness of breath during exertion [Shortness Of Breath] : no shortness of breath [As Noted in HPI] : as noted in HPI [Negative] : Respiratory

## 2024-02-28 NOTE — HISTORY OF PRESENT ILLNESS
[FreeTextEntry1] : labs were sent from her PMD. cr 1.5. otherwise had MRI and renal cyst , following with GI for Pancreatic cyst.  24 hr done and was reviewed today with her, volume of urine much better. not much salt. low Ca and oxalate. UA borderline and low citrate.,  no edema  BP good. A1c 6.2 no diarrhea.

## 2024-03-10 ENCOUNTER — NON-APPOINTMENT (OUTPATIENT)
Age: 79
End: 2024-03-10

## 2024-04-02 ENCOUNTER — APPOINTMENT (OUTPATIENT)
Dept: UROLOGY | Facility: CLINIC | Age: 79
End: 2024-04-02
Payer: MEDICARE

## 2024-04-02 VITALS
OXYGEN SATURATION: 100 % | TEMPERATURE: 98 F | HEART RATE: 80 BPM | SYSTOLIC BLOOD PRESSURE: 126 MMHG | HEIGHT: 64 IN | DIASTOLIC BLOOD PRESSURE: 77 MMHG

## 2024-04-02 DIAGNOSIS — N28.89 OTHER SPECIFIED DISORDERS OF KIDNEY AND URETER: ICD-10-CM

## 2024-04-02 PROCEDURE — 99203 OFFICE O/P NEW LOW 30 MIN: CPT

## 2024-04-02 RX ORDER — ICOSAPENT ETHYL 500 MG/1
CAPSULE ORAL
Refills: 0 | Status: ACTIVE | COMMUNITY

## 2024-04-02 RX ORDER — METFORMIN HYDROCHLORIDE 625 MG/1
TABLET ORAL
Refills: 0 | Status: ACTIVE | COMMUNITY

## 2024-04-02 NOTE — PHYSICAL EXAM
[General Appearance - Well Developed] : well developed [General Appearance - Well Nourished] : well nourished [Well Groomed] : well groomed [Normal Appearance] : normal appearance [General Appearance - In No Acute Distress] : no acute distress [] : no respiratory distress [Respiration, Rhythm And Depth] : normal respiratory rhythm and effort [Exaggerated Use Of Accessory Muscles For Inspiration] : no accessory muscle use [Normal Station and Gait] : the gait and station were normal for the patient's age [No Focal Deficits] : no focal deficits [Oriented To Time, Place, And Person] : oriented to person, place, and time [Affect] : the affect was normal [Mood] : the mood was normal [Not Anxious] : not anxious

## 2024-04-02 NOTE — HISTORY OF PRESENT ILLNESS
[FreeTextEntry1] : Dr. Clara Obrien  [address] [place] [address] [place] [address]  CC: Renal Mass  Jana is a 78 year old female who presents today for discussion regarding small Renal mass seen on MRI. She has a pMHX significant for HTN, Dm type 2, and elevated cholesterol. MRI completed 3/2024 for cyst on pancreas-1.1 cm left upper pole Non enhancing mass compatible with hemorrhagic/proteinaceous cyst also 4 mm slightly exophytic mass posterior right kidney. Concern for small renal call carcinoma.  Multiple bilateral renal cyst. Lesion not seen on prior MRI in .  No urological complaints. Occasional UTI- usually treated by PCP and outside urologist (Dr. Lin)  No recent UTI, no gross hematuria or dysuria currently    Surgical Hx:  x 3, open cholecystectomy, carpel tunnel surgery, right knee surgery Social Hx: nonsmoker, no ETOH, retired teacher Family Hx: father-prostate cancer

## 2024-04-02 NOTE — ASSESSMENT
[FreeTextEntry1] : Diagnosis: Small Renal Mass  plan: MRI reviewed with patient Plan on follow up MRI in 6 months  We reviewed the possible underlying histology of solid enhancing renal masses, with the majority being malignant (~80%) whereas ~20% are benign (e.g. oncocytoma). We discussed the role/possibility of percutaneous biopsy, with the associated risks, benefits, complications, and accuracy issues (e.g. risk of false negative results).   The heterogeneous natural history/biology of renal cell carcinoma was discussed, including the fact that while many renal cancers are indolent, others behave aggresssively.   Options were reviewed including, not limited to, active surveillance (AS), surgical extirpation and ablation. The risks of tumor growth and metastasis on active surveillance were reviewed, including the fact that metastatic progression on AS could mean missing the opportunity for cure. The average growth rate of ~2-3 mm/year and metastasis rates of ~2-3% on AS over 5 year interval for small renal masses <4 cm was discussed.   With respect to treatment we reviewed ablation (cryosurgery, radiofrequency ablation), risks of recurrence, opportunities for salvage treatment, and imaging requirements for follow up. Oncologic outcomes for ablation were reviewed.   With respect to surgery we reviewed nephron sparing surgery vs. radical nephrectomy, as well as open vs. minimally invasive surgical (MIS) approaches (e.g. laparoscopy and robotic assisted laparoscopic surgery). Personal and institutional experience, as well as other published literature was reviewed. Oncologic outcomes, renal functional outcomes were compared and contrasted between radical vs. NSS and open vs. MIS surgery. Risks of acute kidney injury, medical/surgical chronic kidney disease (either exacerbation or new-onset), as well as risks of ESRD development were reviewed. Risks of conversion from MIS to open surgery discussed. Risks of converting from attempted partial nephrectomy to radical nephrectomy were discussed. Risks of surgical complications were reviewed, including not limited to: bleeding//life-threatening hemorrhage, vascular/bowel/adjacent visceral organ injury, trocar/access injury, the possibility of recognized vs. unrecognized/delayed-recognition injury, risks of thermal/blunt/sharp/retraction injury, risks of DVT, PE, MI, death, risks of cardiopulmonary/anesthesia related complications, positional injury, infection/collection/abscess, wound complications/dehiscence/seroma/cellulitis, urinoma/fistula, ureteral injury/obstruction, as well as other complications    RTC in 6 months with MRI prior     Haile Richstone, MD, FACS, FRCS  of Urology [facility] Director of Laparoscopic and Robotic Surgery [facility] Director of Urology, [facility] Professor of Urology (Office) [phone] (Cell) [phone] [email or web address]   I performed history/review of imaging, discussed treatment plan with patient, agree with above transcription by WILLIAM.  The total amount of time I have personally spent preparing for this visit, reviewing the patient's test results, obtaining external history, ordering tests/medications, documenting clinical information, communicating with and counseling the patient/family and/or caregiver(s), and spent face to face with the patient explaining the above was minutes = 30

## 2024-05-06 RX ORDER — IRBESARTAN 75 MG/1
75 TABLET ORAL
Qty: 90 | Refills: 3 | Status: ACTIVE | COMMUNITY
Start: 2022-05-11 | End: 1900-01-01

## 2024-05-06 RX ORDER — AMLODIPINE BESYLATE 2.5 MG/1
2.5 TABLET ORAL
Qty: 90 | Refills: 3 | Status: ACTIVE | COMMUNITY
Start: 2022-08-10 | End: 1900-01-01

## 2024-07-10 ENCOUNTER — APPOINTMENT (OUTPATIENT)
Dept: NEPHROLOGY | Facility: CLINIC | Age: 79
End: 2024-07-10
Payer: MEDICARE

## 2024-07-10 VITALS
BODY MASS INDEX: 26.94 KG/M2 | SYSTOLIC BLOOD PRESSURE: 146 MMHG | DIASTOLIC BLOOD PRESSURE: 73 MMHG | HEIGHT: 64 IN | WEIGHT: 157.83 LBS | HEART RATE: 81 BPM | OXYGEN SATURATION: 99 % | TEMPERATURE: 97.8 F

## 2024-07-10 DIAGNOSIS — Z79.4 TYPE 2 DIABETES MELLITUS WITH STABLE PROLIFERATIVE DIABETIC RETINOPATHY, LEFT EYE: ICD-10-CM

## 2024-07-10 DIAGNOSIS — E11.3552 TYPE 2 DIABETES MELLITUS WITH STABLE PROLIFERATIVE DIABETIC RETINOPATHY, LEFT EYE: ICD-10-CM

## 2024-07-10 DIAGNOSIS — N18.32 CHRONIC KIDNEY DISEASE, STAGE 3B: ICD-10-CM

## 2024-07-10 PROCEDURE — G2211 COMPLEX E/M VISIT ADD ON: CPT

## 2024-07-10 PROCEDURE — 99214 OFFICE O/P EST MOD 30 MIN: CPT

## 2024-07-11 LAB
ALBUMIN SERPL ELPH-MCNC: 4.4 G/DL
ANION GAP SERPL CALC-SCNC: 16 MMOL/L
APPEARANCE: CLEAR
BACTERIA: NEGATIVE /HPF
BILIRUBIN URINE: NEGATIVE
BLOOD URINE: NEGATIVE
BUN SERPL-MCNC: 28 MG/DL
CALCIUM SERPL-MCNC: 9.2 MG/DL
CAST: 1 /LPF
CHLORIDE SERPL-SCNC: 103 MMOL/L
CO2 SERPL-SCNC: 21 MMOL/L
COLOR: YELLOW
CREAT SERPL-MCNC: 1.59 MG/DL
CREAT SPEC-SCNC: 126 MG/DL
EGFR: 33 ML/MIN/1.73M2
EPITHELIAL CELLS: 7 /HPF
ESTIMATED AVERAGE GLUCOSE: 140 MG/DL
FERRITIN SERPL-MCNC: 58 NG/ML
GLUCOSE QUALITATIVE U: NEGATIVE MG/DL
GLUCOSE SERPL-MCNC: 97 MG/DL
HBA1C MFR BLD HPLC: 6.5 %
HDLC SERPL-MCNC: 50 MG/DL
HGB BLD-MCNC: 11.8 G/DL
IRON SATN MFR SERPL: 26 %
IRON SERPL-MCNC: 71 UG/DL
KETONES URINE: NEGATIVE MG/DL
LDLC SERPL CALC-MCNC: 60 MG/DL
LEUKOCYTE ESTERASE URINE: ABNORMAL
MICROSCOPIC-UA: NORMAL
NITRITE URINE: NEGATIVE
NONHDLC SERPL-MCNC: 79 MG/DL
PH URINE: 5.5
PHOSPHATE SERPL-MCNC: 3.3 MG/DL
PROTEIN URINE: NEGATIVE MG/DL
RED BLOOD CELLS URINE: 1 /HPF
SODIUM SERPL-SCNC: 140 MMOL/L
SPECIFIC GRAVITY URINE: 1.02
TRIGL SERPL-MCNC: 107 MG/DL
UIBC SERPL-MCNC: 205 UG/DL
UROBILINOGEN URINE: 0.2 MG/DL
WHITE BLOOD CELLS URINE: 8 /HPF

## 2024-08-01 ENCOUNTER — APPOINTMENT (OUTPATIENT)
Dept: ENDOCRINOLOGY | Facility: CLINIC | Age: 79
End: 2024-08-01

## 2024-08-01 VITALS
HEIGHT: 64 IN | HEART RATE: 82 BPM | BODY MASS INDEX: 27.31 KG/M2 | SYSTOLIC BLOOD PRESSURE: 120 MMHG | OXYGEN SATURATION: 98 % | WEIGHT: 160 LBS | DIASTOLIC BLOOD PRESSURE: 72 MMHG

## 2024-08-01 DIAGNOSIS — E78.2 MIXED HYPERLIPIDEMIA: ICD-10-CM

## 2024-08-01 DIAGNOSIS — M81.0 AGE-RELATED OSTEOPOROSIS W/OUT CURRENT PATHOLOGICAL FRACTURE: ICD-10-CM

## 2024-08-01 DIAGNOSIS — E11.8 TYPE 2 DIABETES MELLITUS WITH UNSPECIFIED COMPLICATIONS: ICD-10-CM

## 2024-08-01 DIAGNOSIS — I10 ESSENTIAL (PRIMARY) HYPERTENSION: ICD-10-CM

## 2024-08-01 PROCEDURE — 99204 OFFICE O/P NEW MOD 45 MIN: CPT

## 2024-08-01 PROCEDURE — G2211 COMPLEX E/M VISIT ADD ON: CPT

## 2024-08-02 ENCOUNTER — APPOINTMENT (OUTPATIENT)
Dept: ENDOCRINOLOGY | Facility: CLINIC | Age: 79
End: 2024-08-02
Payer: MEDICARE

## 2024-08-02 VITALS — BODY MASS INDEX: 27.31 KG/M2 | HEIGHT: 64 IN | WEIGHT: 160 LBS

## 2024-08-02 PROCEDURE — G0108 DIAB MANAGE TRN  PER INDIV: CPT | Mod: 93

## 2024-08-05 PROBLEM — M81.0 OSTEOPOROSIS: Status: ACTIVE | Noted: 2024-08-01

## 2024-08-05 PROBLEM — E78.2 COMBINED HYPERLIPIDEMIA: Status: ACTIVE | Noted: 2024-08-05

## 2024-08-05 PROBLEM — E11.8 DIABETES MELLITUS TYPE 2 WITH COMPLICATIONS: Status: ACTIVE | Noted: 2024-08-05

## 2024-08-05 PROBLEM — M81.0 OSTEOPOROSIS, POST-MENOPAUSAL: Status: ACTIVE | Noted: 2024-08-05

## 2024-08-05 NOTE — HISTORY OF PRESENT ILLNESS
[Vitamin D (oral)] : Vitamin D orally [Risedronate (Actonel)] : Risedronate [Finger Stick] : Finger Stick: Yes [FreeTextEntry1] : Patient is a 77 yo F w CKD3b, DEZ, HTN, HLD , osteoporosis, DM2 who presents to clinic to establish care and management of DM2.  In terms of DM2- patient was diagnosed 15 years prior when she was incidentally found to have glucosuria. She was initially started on glipizide, however was discontinued after hospitalization for abdominal pain and switched to Metformin. She states she has been well controlled on Metformin for over a decade. She states her a1c has never risen above 7.0%. She has a glucometer in which she checks daily fasting glucose readings, which range from 100-140 mg/dL. In terms of meals, she reports:  breakfast: 1/2 onion roll + coffee lunch: 1/2 sandwhich dinner: chicken + potato She is cognizant about what types of foods increase her blood glucose levels, in which she eats sparingly (including red meat products), however would like further guidance on proper diet. She does not participate in organized exercise.  In terms of HTN, CKD3b: she follows with nephrology in which she is adherent with amlodipine and irbesartan with well controlled blood pressures.  In terms of HLD: she takes atorvastatin without muscle pains.  In terms of osteoporosis:  [place] hairline fracture to her R hip (non surgical). Most recent bone density scan (7/2022) with the following results: lumbar spine: T score 0 (prior -0.5), L femoral neck T score -1.6 (prior -1.9), L forearm T score -2.8 (prior -2.4). She was started on Risendronate 35 mg every other week by her nephrologist.  Otherwise neg ROS [Continuous Glucose Monitoring] : Continuous Glucose Monitoring: No [FreeTextEntry9] : 100-150

## 2024-08-05 NOTE — PHYSICAL EXAM
[Alert] : alert [Well Developed] : well developed [Normal Sclera/Conjunctiva] : normal sclera/conjunctiva [Visual Fields Grossly Intact] : visual fields grossly intact [Normal Hearing] : hearing was normal [Normal Lips/Gums] : the lips and gums were normal [No Neck Mass] : no neck mass was observed [Supple] : the neck was supple [Thyroid Not Enlarged] : the thyroid was not enlarged [No Thyroid Nodules] : no palpable thyroid nodules [No Respiratory Distress] : no respiratory distress [No Accessory Muscle Use] : no accessory muscle use [Normal Rate and Effort] : normal respiratory rate and effort [Clear to Auscultation] : lungs were clear to auscultation bilaterally [Normal to Percussion] : lungs were normal to percussion [Normal S1, S2] : normal S1 and S2 [No Murmurs] : no murmurs [Normal Rate] : heart rate was normal [Regular Rhythm] : with a regular rhythm [No Rubs] : no pericardial rub [Normal Bowel Sounds] : normal bowel sounds [Not Tender] : non-tender [Not Distended] : not distended [No Masses] : no abdominal mass palpated [Soft] : abdomen soft [No Stigmata of Cushings Syndrome] : no stigmata of Cushings Syndrome [Normal Gait] : normal gait [No Joint Swelling] : no joint swelling seen [Normal Strength/Tone] : muscle strength and tone were normal [No Rash] : no rash [No Skin Lesions] : no skin lesions [Normal Reflexes] : deep tendon reflexes were 2+ and symmetric [Oriented x3] : oriented to person, place, and time [Normal Affect] : the affect was normal [Normal Insight/Judgement] : insight and judgment were intact [Normal Mood] : the mood was normal [de-identified] : 2+ pitting edema of LE to midshin b/l

## 2024-08-05 NOTE — ASSESSMENT
[FreeTextEntry1] : Patient is a 77 yo F w CKD3b, DEZ, HTN, HLD , osteoporosis, DM2 who presents to clinic to establish care and management of DM2.  #DM2 Diagnosed 15 years prior. She was initially started on glipizide, was discontinued after hospitalization for abdominal pain and switched to Metformin. She states she has been well controlled on Metformin for over a decade. She states her a1c has never risen above 7.0%. She has a glucometer in which she checks daily fasting glucose readings, which range from 100-140 mg/dL.  7/2024 a1c 6.5% Plan - continue to monitor fasting blood sugars -  referred to nutritionist  - check Urine alb/creat ratio - continue Metformin 500mg BID - Given hx of CKD, extensive conversation about risk/benefits of starting Farxiga. Patient hesitant due to concerns about side effects. Patient without hx of albuminuria. Pending UACr, will determine benefit of initiating treatment with farxiga,  patient agrees with plan  #osteoporosis  Most recent bone density scan (7/2022) with the following results: lumbar spine: T score 0 (prior -0.5), L femoral neck T score -1.6 (prior -1.9), L forearm T score -2.8 (prior -2.4). She was started on Risendronate 35 mg every other week by her nephrologist approx 1.5 years ago On vitamin D3 1000mg daily Plan - continue vitamin D3 daily - check DEXA scan  - check PTH  -continue Risendronate   #HTN On irbesartan 75mg, amlodipine 2.5mg Today, /72. Patient reports good control on current regimen.  Plan - continue with above regimen  #HLD On atorvastatin 10mg 7/2024 , , HDL 50, LDL 60 Plan - continue statin as above  #pancreatic cysts MRI with 8 mm nonenhancing mass in pancreatic head,  4 mm nonenhancing focus in pancreatic neck, and 4 mm nonenhancing pass in pancreatic tail. Patient reports she has been seen prior by [facility] physician who was not worried about cysts.  Plan - continue to monitor   #CKD3b Follows closely with Dr. Obrien Plan - continue with follow up and plan as per Dr. Obrien  Follow up in 3-4 months

## 2024-08-05 NOTE — PHYSICAL EXAM
[Alert] : alert [Well Developed] : well developed [Normal Sclera/Conjunctiva] : normal sclera/conjunctiva [Visual Fields Grossly Intact] : visual fields grossly intact [Normal Hearing] : hearing was normal [Normal Lips/Gums] : the lips and gums were normal [No Neck Mass] : no neck mass was observed [Supple] : the neck was supple [Thyroid Not Enlarged] : the thyroid was not enlarged [No Thyroid Nodules] : no palpable thyroid nodules [No Respiratory Distress] : no respiratory distress [No Accessory Muscle Use] : no accessory muscle use [Normal Rate and Effort] : normal respiratory rate and effort [Clear to Auscultation] : lungs were clear to auscultation bilaterally [Normal to Percussion] : lungs were normal to percussion [Normal S1, S2] : normal S1 and S2 [No Murmurs] : no murmurs [Normal Rate] : heart rate was normal [Regular Rhythm] : with a regular rhythm [No Rubs] : no pericardial rub [Normal Bowel Sounds] : normal bowel sounds [Not Tender] : non-tender [Not Distended] : not distended [No Masses] : no abdominal mass palpated [Soft] : abdomen soft [No Stigmata of Cushings Syndrome] : no stigmata of Cushings Syndrome [Normal Gait] : normal gait [No Joint Swelling] : no joint swelling seen [Normal Strength/Tone] : muscle strength and tone were normal [No Rash] : no rash [No Skin Lesions] : no skin lesions [Normal Reflexes] : deep tendon reflexes were 2+ and symmetric [Oriented x3] : oriented to person, place, and time [Normal Affect] : the affect was normal [Normal Insight/Judgement] : insight and judgment were intact [Normal Mood] : the mood was normal [de-identified] : 2+ pitting edema of LE to midshin b/l

## 2024-08-05 NOTE — HISTORY OF PRESENT ILLNESS
[Vitamin D (oral)] : Vitamin D orally [Risedronate (Actonel)] : Risedronate [Finger Stick] : Finger Stick: Yes [FreeTextEntry1] : Patient is a 79 yo F w CKD3b, DEZ, HTN, HLD , osteoporosis, DM2 who presents to clinic to establish care and management of DM2.  In terms of DM2- patient was diagnosed 15 years prior when she was incidentally found to have glucosuria. She was initially started on glipizide, however was discontinued after hospitalization for abdominal pain and switched to Metformin. She states she has been well controlled on Metformin for over a decade. She states her a1c has never risen above 7.0%. She has a glucometer in which she checks daily fasting glucose readings, which range from 100-140 mg/dL. In terms of meals, she reports:  breakfast: 1/2 onion roll + coffee lunch: 1/2 sandwhich dinner: chicken + potato She is cognizant about what types of foods increase her blood glucose levels, in which she eats sparingly (including red meat products), however would like further guidance on proper diet. She does not participate in organized exercise.  In terms of HTN, CKD3b: she follows with nephrology in which she is adherent with amlodipine and irbesartan with well controlled blood pressures.  In terms of HLD: she takes atorvastatin without muscle pains.  In terms of osteoporosis:  [place]line fracture to her R hip (non surgical). Most recent bone density scan (7/2022) with the following results: lumbar spine: T score 0 (prior -0.5), L femoral neck T score -1.6 (prior -1.9), L forearm T score -2.8 (prior -2.4). She was started on Risendronate 35 mg every other week by her nephrologist.  Otherwise neg ROS [Continuous Glucose Monitoring] : Continuous Glucose Monitoring: No [FreeTextEntry9] : 100-150

## 2024-08-05 NOTE — CONSULT LETTER
[Dear  ___] : Dear  [unfilled], [Please see my note below.] : Please see my note below. [Consult Closing:] : Thank you very much for allowing me to participate in the care of this patient.  If you have any questions, please do not hesitate to contact me. [Sincerely,] : Sincerely, [FreeTextEntry2] : Jeronimo Obrien MD [FreeTextEntry1] : I had the pleasure of seeing your patient, SAMANTHA HUITRON, in consultation for diabetes management. [FreeTextEntry3] : Cinthya Kaiser MD, PhD

## 2024-08-05 NOTE — ASSESSMENT
[FreeTextEntry1] : Patient is a 77 yo F w CKD3b, DEZ, HTN, HLD , osteoporosis, DM2 who presents to clinic to establish care and management of DM2.  #DM2 Diagnosed 15 years prior. She was initially started on glipizide, was discontinued after hospitalization for abdominal pain and switched to Metformin. She states she has been well controlled on Metformin for over a decade. She states her a1c has never risen above 7.0%. She has a glucometer in which she checks daily fasting glucose readings, which range from 100-140 mg/dL.  7/2024 a1c 6.5% Plan - continue to monitor fasting blood sugars -  referred to nutritionist  - check Urine alb/creat ratio - continue Metformin 500mg BID - Given hx of CKD, extensive conversation about risk/benefits of starting Farxiga. Patient hesitant due to concerns about side effects. Patient without hx of albuminuria. Pending UACr, will determine benefit of initiating treatment with farxiga,  patient agrees with plan  #osteoporosis  Most recent bone density scan (7/2022) with the following results: lumbar spine: T score 0 (prior -0.5), L femoral neck T score -1.6 (prior -1.9), L forearm T score -2.8 (prior -2.4). She was started on Risendronate 35 mg every other week by her nephrologist approx 1.5 years ago On vitamin D3 1000mg daily Plan - continue vitamin D3 daily - check DEXA scan  - check PTH  -continue Risendronate   #HTN On irbesartan 75mg, amlodipine 2.5mg Today, /72. Patient reports good control on current regimen.  Plan - continue with above regimen  #HLD On atorvastatin 10mg 7/2024 , , HDL 50, LDL 60 Plan - continue statin as above  #pancreatic cysts MRI with 8 mm nonenhancing mass in pancreatic head,  4 mm nonenhancing focus in pancreatic neck, and 4 mm nonenhancing pass in pancreatic tail. Patient reports she has been seen prior by [facility] physician who was not worried about cysts.  Plan - continue to monitor   #CKD3b Follows closely with Dr. Obrien Plan - continue with follow up and plan as per Dr. Obrien  Follow up in 3-4 months

## 2024-08-06 LAB
CALCIUM SERPL-MCNC: 10 MG/DL
CREAT SPEC-SCNC: 105 MG/DL
MICROALBUMIN 24H UR DL<=1MG/L-MCNC: <1.2 MG/DL
MICROALBUMIN/CREAT 24H UR-RTO: NORMAL MG/G
PARATHYROID HORMONE INTACT: 52 PG/ML

## 2024-10-24 ENCOUNTER — APPOINTMENT (OUTPATIENT)
Dept: UROLOGY | Facility: CLINIC | Age: 79
End: 2024-10-24
Payer: MEDICARE

## 2024-10-24 VITALS
SYSTOLIC BLOOD PRESSURE: 136 MMHG | TEMPERATURE: 96.7 F | DIASTOLIC BLOOD PRESSURE: 73 MMHG | HEART RATE: 90 BPM | OXYGEN SATURATION: 98 %

## 2024-10-24 PROCEDURE — 99214 OFFICE O/P EST MOD 30 MIN: CPT

## 2024-11-19 ENCOUNTER — NON-APPOINTMENT (OUTPATIENT)
Age: 79
End: 2024-11-19

## 2024-11-19 ENCOUNTER — APPOINTMENT (OUTPATIENT)
Dept: NEPHROLOGY | Facility: CLINIC | Age: 79
End: 2024-11-19
Payer: MEDICARE

## 2024-11-19 VITALS
HEIGHT: 64 IN | OXYGEN SATURATION: 99 % | DIASTOLIC BLOOD PRESSURE: 78 MMHG | BODY MASS INDEX: 27.21 KG/M2 | SYSTOLIC BLOOD PRESSURE: 143 MMHG | HEART RATE: 80 BPM | WEIGHT: 159.39 LBS | TEMPERATURE: 97.9 F

## 2024-11-19 DIAGNOSIS — N18.32 CHRONIC KIDNEY DISEASE, STAGE 3B: ICD-10-CM

## 2024-11-19 PROCEDURE — G2211 COMPLEX E/M VISIT ADD ON: CPT

## 2024-11-19 PROCEDURE — 99214 OFFICE O/P EST MOD 30 MIN: CPT

## 2024-11-20 LAB
ALBUMIN SERPL ELPH-MCNC: 4.2 G/DL
ANION GAP SERPL CALC-SCNC: 14 MMOL/L
APPEARANCE: CLEAR
BACTERIA: NEGATIVE /HPF
BILIRUBIN URINE: NEGATIVE
BLOOD URINE: NEGATIVE
BUN SERPL-MCNC: 25 MG/DL
CALCIUM SERPL-MCNC: 9.5 MG/DL
CAST: 1 /LPF
CHLORIDE SERPL-SCNC: 108 MMOL/L
CO2 SERPL-SCNC: 24 MMOL/L
COLOR: YELLOW
CREAT SERPL-MCNC: 1.49 MG/DL
CREAT SPEC-SCNC: 133 MG/DL
CREAT/PROT UR: 0.1 RATIO
EGFR: 36 ML/MIN/1.73M2
EPITHELIAL CELLS: 10 /HPF
ESTIMATED AVERAGE GLUCOSE: 137 MG/DL
GLUCOSE QUALITATIVE U: NEGATIVE MG/DL
GLUCOSE SERPL-MCNC: 103 MG/DL
HBA1C MFR BLD HPLC: 6.4 %
HCT VFR BLD CALC: 38 %
HGB BLD-MCNC: 11.4 G/DL
KETONES URINE: NEGATIVE MG/DL
LEUKOCYTE ESTERASE URINE: ABNORMAL
MICROSCOPIC-UA: NORMAL
NITRITE URINE: NEGATIVE
PH URINE: 5.5
PHOSPHATE SERPL-MCNC: 3.7 MG/DL
POTASSIUM SERPL-SCNC: 4.5 MMOL/L
PROT UR-MCNC: 8 MG/DL
PROTEIN URINE: NEGATIVE MG/DL
RED BLOOD CELLS URINE: 0 /HPF
SODIUM SERPL-SCNC: 145 MMOL/L
SPECIFIC GRAVITY URINE: 1.02
UROBILINOGEN URINE: 0.2 MG/DL
WHITE BLOOD CELLS URINE: 35 /HPF

## 2024-11-27 ENCOUNTER — APPOINTMENT (OUTPATIENT)
Dept: HEMATOLOGY ONCOLOGY | Facility: CLINIC | Age: 79
End: 2024-11-27
Payer: MEDICARE

## 2024-11-27 VITALS
OXYGEN SATURATION: 99 % | DIASTOLIC BLOOD PRESSURE: 80 MMHG | HEIGHT: 64.5 IN | HEART RATE: 75 BPM | TEMPERATURE: 96.1 F | RESPIRATION RATE: 18 BRPM | WEIGHT: 157 LBS | BODY MASS INDEX: 26.48 KG/M2 | SYSTOLIC BLOOD PRESSURE: 124 MMHG

## 2024-11-27 PROCEDURE — 99205 OFFICE O/P NEW HI 60 MIN: CPT

## 2024-12-12 ENCOUNTER — NON-APPOINTMENT (OUTPATIENT)
Age: 79
End: 2024-12-12

## 2024-12-24 ENCOUNTER — APPOINTMENT (OUTPATIENT)
Dept: ENDOCRINOLOGY | Facility: CLINIC | Age: 79
End: 2024-12-24

## 2025-01-06 ENCOUNTER — APPOINTMENT (OUTPATIENT)
Dept: ENDOCRINOLOGY | Facility: CLINIC | Age: 80
End: 2025-01-06

## 2025-01-06 ENCOUNTER — APPOINTMENT (OUTPATIENT)
Facility: CLINIC | Age: 80
End: 2025-01-06
Payer: MEDICARE

## 2025-01-06 VITALS
SYSTOLIC BLOOD PRESSURE: 125 MMHG | DIASTOLIC BLOOD PRESSURE: 70 MMHG | HEART RATE: 79 BPM | BODY MASS INDEX: 26.98 KG/M2 | WEIGHT: 160 LBS | HEIGHT: 64.5 IN

## 2025-01-06 DIAGNOSIS — M81.0 AGE-RELATED OSTEOPOROSIS W/OUT CURRENT PATHOLOGICAL FRACTURE: ICD-10-CM

## 2025-01-06 DIAGNOSIS — I10 ESSENTIAL (PRIMARY) HYPERTENSION: ICD-10-CM

## 2025-01-06 DIAGNOSIS — E11.8 TYPE 2 DIABETES MELLITUS WITH UNSPECIFIED COMPLICATIONS: ICD-10-CM

## 2025-01-06 DIAGNOSIS — E78.2 MIXED HYPERLIPIDEMIA: ICD-10-CM

## 2025-01-06 LAB — HBA1C MFR BLD HPLC: 6.6

## 2025-01-06 PROCEDURE — 83036 HEMOGLOBIN GLYCOSYLATED A1C: CPT | Mod: QW

## 2025-01-06 PROCEDURE — 99214 OFFICE O/P EST MOD 30 MIN: CPT

## 2025-01-06 PROCEDURE — G2211 COMPLEX E/M VISIT ADD ON: CPT

## 2025-01-23 ENCOUNTER — OUTPATIENT (OUTPATIENT)
Dept: OUTPATIENT SERVICES | Facility: HOSPITAL | Age: 80
LOS: 1 days | End: 2025-01-23

## 2025-01-23 ENCOUNTER — APPOINTMENT (OUTPATIENT)
Dept: MRI IMAGING | Facility: CLINIC | Age: 80
End: 2025-01-23
Payer: MEDICARE

## 2025-01-23 PROCEDURE — 74183 MRI ABD W/O CNTR FLWD CNTR: CPT | Mod: 26

## 2025-02-05 ENCOUNTER — APPOINTMENT (OUTPATIENT)
Dept: HEMATOLOGY ONCOLOGY | Facility: CLINIC | Age: 80
End: 2025-02-05
Payer: MEDICARE

## 2025-02-05 VITALS
DIASTOLIC BLOOD PRESSURE: 81 MMHG | HEART RATE: 80 BPM | OXYGEN SATURATION: 99 % | RESPIRATION RATE: 18 BRPM | BODY MASS INDEX: 28 KG/M2 | SYSTOLIC BLOOD PRESSURE: 146 MMHG | TEMPERATURE: 97.9 F | WEIGHT: 160 LBS | HEIGHT: 63.54 IN

## 2025-02-05 VITALS — DIASTOLIC BLOOD PRESSURE: 81 MMHG | SYSTOLIC BLOOD PRESSURE: 126 MMHG

## 2025-02-05 PROCEDURE — 99215 OFFICE O/P EST HI 40 MIN: CPT

## 2025-03-11 ENCOUNTER — APPOINTMENT (OUTPATIENT)
Dept: NEPHROLOGY | Facility: CLINIC | Age: 80
End: 2025-03-11
Payer: MEDICARE

## 2025-03-11 VITALS
DIASTOLIC BLOOD PRESSURE: 77 MMHG | WEIGHT: 163 LBS | OXYGEN SATURATION: 97 % | SYSTOLIC BLOOD PRESSURE: 134 MMHG | HEIGHT: 63.54 IN | HEART RATE: 82 BPM | TEMPERATURE: 97.3 F | BODY MASS INDEX: 28.53 KG/M2

## 2025-03-11 DIAGNOSIS — N18.32 CHRONIC KIDNEY DISEASE, STAGE 3B: ICD-10-CM

## 2025-03-11 PROCEDURE — 99214 OFFICE O/P EST MOD 30 MIN: CPT

## 2025-03-11 PROCEDURE — G2211 COMPLEX E/M VISIT ADD ON: CPT

## 2025-03-11 RX ORDER — EMPAGLIFLOZIN 10 MG/1
10 TABLET, FILM COATED ORAL DAILY
Qty: 30 | Refills: 3 | Status: ACTIVE | COMMUNITY
Start: 2025-03-11 | End: 1900-01-01

## 2025-03-12 LAB
APPEARANCE: CLEAR
BACTERIA: NEGATIVE /HPF
BILIRUBIN URINE: NEGATIVE
BLOOD URINE: NEGATIVE
CAST: 0 /LPF
COLOR: YELLOW
CREAT SPEC-SCNC: 106 MG/DL
CREAT/PROT UR: 0.1 RATIO
EPITHELIAL CELLS: 1 /HPF
GLUCOSE QUALITATIVE U: NEGATIVE MG/DL
KETONES URINE: NEGATIVE MG/DL
LEUKOCYTE ESTERASE URINE: ABNORMAL
MICROSCOPIC-UA: NORMAL
NITRITE URINE: NEGATIVE
PH URINE: 5.5
PROT UR-MCNC: 5 MG/DL
PROTEIN URINE: NEGATIVE MG/DL
RED BLOOD CELLS URINE: 1 /HPF
SPECIFIC GRAVITY URINE: 1.02
UROBILINOGEN URINE: 0.2 MG/DL
WHITE BLOOD CELLS URINE: 2 /HPF

## 2025-03-25 DIAGNOSIS — N18.32 CHRONIC KIDNEY DISEASE, STAGE 3B: ICD-10-CM

## 2025-03-25 RX ORDER — DAPAGLIFLOZIN 10 MG/1
10 TABLET, FILM COATED ORAL DAILY
Qty: 90 | Refills: 3 | Status: ACTIVE | COMMUNITY
Start: 2025-03-25 | End: 1900-01-01

## 2025-06-05 ENCOUNTER — APPOINTMENT (OUTPATIENT)
Dept: MRI IMAGING | Facility: CLINIC | Age: 80
End: 2025-06-05
Payer: MEDICARE

## 2025-06-05 ENCOUNTER — APPOINTMENT (OUTPATIENT)
Dept: CT IMAGING | Facility: CLINIC | Age: 80
End: 2025-06-05
Payer: MEDICARE

## 2025-06-05 ENCOUNTER — OUTPATIENT (OUTPATIENT)
Dept: OUTPATIENT SERVICES | Facility: HOSPITAL | Age: 80
LOS: 1 days | End: 2025-06-05

## 2025-06-05 PROCEDURE — 74183 MRI ABD W/O CNTR FLWD CNTR: CPT | Mod: 26

## 2025-06-05 PROCEDURE — 71250 CT THORAX DX C-: CPT | Mod: 26

## 2025-06-09 ENCOUNTER — TRANSCRIPTION ENCOUNTER (OUTPATIENT)
Age: 80
End: 2025-06-09

## 2025-06-19 ENCOUNTER — APPOINTMENT (OUTPATIENT)
Facility: CLINIC | Age: 80
End: 2025-06-19
Payer: MEDICARE

## 2025-06-19 VITALS
DIASTOLIC BLOOD PRESSURE: 78 MMHG | WEIGHT: 159 LBS | HEIGHT: 63.54 IN | HEART RATE: 72 BPM | BODY MASS INDEX: 27.82 KG/M2 | SYSTOLIC BLOOD PRESSURE: 128 MMHG

## 2025-06-19 LAB — HBA1C MFR BLD HPLC: 6.5

## 2025-06-19 PROCEDURE — 99214 OFFICE O/P EST MOD 30 MIN: CPT

## 2025-06-19 PROCEDURE — 83036 HEMOGLOBIN GLYCOSYLATED A1C: CPT | Mod: QW

## 2025-06-19 PROCEDURE — G2211 COMPLEX E/M VISIT ADD ON: CPT

## 2025-06-24 LAB
ANION GAP SERPL CALC-SCNC: 14 MMOL/L
BUN SERPL-MCNC: 33 MG/DL
CALCIUM SERPL-MCNC: 9.7 MG/DL
CHLORIDE SERPL-SCNC: 107 MMOL/L
CO2 SERPL-SCNC: 22 MMOL/L
CREAT SERPL-MCNC: 1.93 MG/DL
EGFRCR SERPLBLD CKD-EPI 2021: 26 ML/MIN/1.73M2
GLUCOSE SERPL-MCNC: 93 MG/DL
POTASSIUM SERPL-SCNC: 4.6 MMOL/L
SODIUM SERPL-SCNC: 142 MMOL/L

## 2025-07-02 ENCOUNTER — APPOINTMENT (OUTPATIENT)
Dept: HEMATOLOGY ONCOLOGY | Facility: CLINIC | Age: 80
End: 2025-07-02
Payer: MEDICARE

## 2025-07-02 VITALS
BODY MASS INDEX: 27.65 KG/M2 | DIASTOLIC BLOOD PRESSURE: 82 MMHG | HEIGHT: 63.54 IN | HEART RATE: 71 BPM | WEIGHT: 158 LBS | SYSTOLIC BLOOD PRESSURE: 147 MMHG | RESPIRATION RATE: 18 BRPM | TEMPERATURE: 97.9 F | OXYGEN SATURATION: 100 %

## 2025-07-02 PROCEDURE — 99215 OFFICE O/P EST HI 40 MIN: CPT

## 2025-07-07 ENCOUNTER — LABORATORY RESULT (OUTPATIENT)
Age: 80
End: 2025-07-07

## 2025-07-07 ENCOUNTER — APPOINTMENT (OUTPATIENT)
Dept: NEPHROLOGY | Facility: CLINIC | Age: 80
End: 2025-07-07
Payer: MEDICARE

## 2025-07-07 VITALS
OXYGEN SATURATION: 100 % | DIASTOLIC BLOOD PRESSURE: 80 MMHG | TEMPERATURE: 97.3 F | HEART RATE: 65 BPM | WEIGHT: 160 LBS | HEIGHT: 63.54 IN | BODY MASS INDEX: 28 KG/M2 | SYSTOLIC BLOOD PRESSURE: 124 MMHG

## 2025-07-07 PROBLEM — R35.0 FREQUENCY OF URINATION: Status: ACTIVE | Noted: 2025-07-07

## 2025-07-07 PROCEDURE — 99214 OFFICE O/P EST MOD 30 MIN: CPT

## 2025-07-07 PROCEDURE — G2211 COMPLEX E/M VISIT ADD ON: CPT

## 2025-07-08 LAB
ALBUMIN SERPL ELPH-MCNC: 4.3 G/DL
ANION GAP SERPL CALC-SCNC: 13 MMOL/L
APPEARANCE: CLEAR
BILIRUBIN URINE: NEGATIVE
BLOOD URINE: NEGATIVE
BUN SERPL-MCNC: 33 MG/DL
CALCIUM SERPL-MCNC: 9.7 MG/DL
CHLORIDE SERPL-SCNC: 107 MMOL/L
CO2 SERPL-SCNC: 23 MMOL/L
COLOR: YELLOW
CREAT SERPL-MCNC: 1.68 MG/DL
CREAT SPEC-SCNC: 90 MG/DL
CREAT/PROT UR: 0.1 RATIO
EGFRCR SERPLBLD CKD-EPI 2021: 31 ML/MIN/1.73M2
GLUCOSE QUALITATIVE U: >=1000 MG/DL
GLUCOSE SERPL-MCNC: 88 MG/DL
KETONES URINE: NEGATIVE MG/DL
LEUKOCYTE ESTERASE URINE: ABNORMAL
NITRITE URINE: NEGATIVE
PH URINE: 5.5
PHOSPHATE SERPL-MCNC: 3.9 MG/DL
POTASSIUM SERPL-SCNC: 4.5 MMOL/L
PROT UR-MCNC: 6 MG/DL
PROTEIN URINE: NEGATIVE MG/DL
SODIUM SERPL-SCNC: 143 MMOL/L
SPECIFIC GRAVITY URINE: 1.02
UROBILINOGEN URINE: 0.2 MG/DL

## 2025-07-15 ENCOUNTER — APPOINTMENT (OUTPATIENT)
Dept: UROLOGY | Facility: CLINIC | Age: 80
End: 2025-07-15
Payer: MEDICARE

## 2025-07-15 VITALS
DIASTOLIC BLOOD PRESSURE: 77 MMHG | BODY MASS INDEX: 28.35 KG/M2 | HEART RATE: 83 BPM | OXYGEN SATURATION: 98 % | TEMPERATURE: 98.7 F | WEIGHT: 160 LBS | HEIGHT: 63 IN | SYSTOLIC BLOOD PRESSURE: 130 MMHG

## 2025-07-15 LAB
APPEARANCE: CLEAR
BILIRUBIN URINE: NEGATIVE
BLOOD URINE: NEGATIVE
COLOR: YELLOW
GLUCOSE QUALITATIVE U: 500 MG/DL
KETONES URINE: NEGATIVE MG/DL
LEUKOCYTE ESTERASE URINE: NEGATIVE
NITRITE URINE: NEGATIVE
PH URINE: 5.5
PROTEIN URINE: NEGATIVE MG/DL
SPECIFIC GRAVITY URINE: 1.02
UROBILINOGEN URINE: 0.2 MG/DL

## 2025-07-15 PROCEDURE — 99214 OFFICE O/P EST MOD 30 MIN: CPT

## 2025-07-18 ENCOUNTER — NON-APPOINTMENT (OUTPATIENT)
Age: 80
End: 2025-07-18

## 2025-08-18 ENCOUNTER — APPOINTMENT (OUTPATIENT)
Dept: UROLOGY | Facility: CLINIC | Age: 80
End: 2025-08-18

## 2025-08-20 ENCOUNTER — APPOINTMENT (OUTPATIENT)
Dept: HEMATOLOGY ONCOLOGY | Facility: CLINIC | Age: 80
End: 2025-08-20
Payer: MEDICARE

## 2025-08-20 VITALS
BODY MASS INDEX: 51.91 KG/M2 | WEIGHT: 293 LBS | HEIGHT: 63 IN | HEART RATE: 78 BPM | TEMPERATURE: 98.2 F | RESPIRATION RATE: 18 BRPM | SYSTOLIC BLOOD PRESSURE: 130 MMHG | OXYGEN SATURATION: 98 % | DIASTOLIC BLOOD PRESSURE: 76 MMHG

## 2025-08-20 PROCEDURE — 99204 OFFICE O/P NEW MOD 45 MIN: CPT
